# Patient Record
Sex: FEMALE | Race: WHITE | NOT HISPANIC OR LATINO | Employment: PART TIME | URBAN - METROPOLITAN AREA
[De-identification: names, ages, dates, MRNs, and addresses within clinical notes are randomized per-mention and may not be internally consistent; named-entity substitution may affect disease eponyms.]

---

## 2017-01-05 ENCOUNTER — TRANSCRIBE ORDERS (OUTPATIENT)
Dept: ADMINISTRATIVE | Facility: HOSPITAL | Age: 22
End: 2017-01-05

## 2017-01-05 ENCOUNTER — ALLSCRIPTS OFFICE VISIT (OUTPATIENT)
Dept: OTHER | Facility: OTHER | Age: 22
End: 2017-01-05

## 2017-01-05 DIAGNOSIS — R01.1 CARDIAC MURMUR: ICD-10-CM

## 2017-01-05 DIAGNOSIS — M54.9 DORSALGIA: ICD-10-CM

## 2017-01-05 DIAGNOSIS — R01.0 STILL'S MURMUR: Primary | ICD-10-CM

## 2017-01-05 DIAGNOSIS — N94.6 DYSMENORRHEA: ICD-10-CM

## 2017-01-05 DIAGNOSIS — Z13.6 ENCOUNTER FOR SCREENING FOR CARDIOVASCULAR DISORDERS: ICD-10-CM

## 2017-01-13 ENCOUNTER — APPOINTMENT (OUTPATIENT)
Dept: LAB | Age: 22
End: 2017-01-13
Payer: COMMERCIAL

## 2017-01-13 ENCOUNTER — TRANSCRIBE ORDERS (OUTPATIENT)
Dept: ADMINISTRATIVE | Age: 22
End: 2017-01-13

## 2017-01-13 DIAGNOSIS — N94.6 DYSMENORRHEA: ICD-10-CM

## 2017-01-13 DIAGNOSIS — R01.1 CARDIAC MURMUR: ICD-10-CM

## 2017-01-13 DIAGNOSIS — Z13.6 ENCOUNTER FOR SCREENING FOR CARDIOVASCULAR DISORDERS: ICD-10-CM

## 2017-01-13 LAB
ALBUMIN SERPL BCP-MCNC: 3.8 G/DL (ref 3.5–5)
ALP SERPL-CCNC: 70 U/L (ref 46–116)
ALT SERPL W P-5'-P-CCNC: 28 U/L (ref 12–78)
ANION GAP SERPL CALCULATED.3IONS-SCNC: 7 MMOL/L (ref 4–13)
AST SERPL W P-5'-P-CCNC: 17 U/L (ref 5–45)
BILIRUB SERPL-MCNC: 0.47 MG/DL (ref 0.2–1)
BUN SERPL-MCNC: 14 MG/DL (ref 5–25)
CALCIUM SERPL-MCNC: 9 MG/DL (ref 8.3–10.1)
CHLORIDE SERPL-SCNC: 109 MMOL/L (ref 100–108)
CHOLEST SERPL-MCNC: 144 MG/DL (ref 50–200)
CO2 SERPL-SCNC: 25 MMOL/L (ref 21–32)
CREAT SERPL-MCNC: 0.84 MG/DL (ref 0.6–1.3)
ERYTHROCYTE [DISTWIDTH] IN BLOOD BY AUTOMATED COUNT: 13.2 % (ref 11.6–15.1)
GFR SERPL CREATININE-BSD FRML MDRD: >60 ML/MIN/1.73SQ M
GLUCOSE SERPL-MCNC: 86 MG/DL (ref 65–140)
HCT VFR BLD AUTO: 37.9 % (ref 34.8–46.1)
HDLC SERPL-MCNC: 56 MG/DL (ref 40–60)
HGB BLD-MCNC: 12.6 G/DL (ref 11.5–15.4)
LDLC SERPL CALC-MCNC: 80 MG/DL (ref 0–100)
MCH RBC QN AUTO: 29.6 PG (ref 26.8–34.3)
MCHC RBC AUTO-ENTMCNC: 33.2 G/DL (ref 31.4–37.4)
MCV RBC AUTO: 89 FL (ref 82–98)
PLATELET # BLD AUTO: 240 THOUSANDS/UL (ref 149–390)
PMV BLD AUTO: 10.2 FL (ref 8.9–12.7)
POTASSIUM SERPL-SCNC: 4.3 MMOL/L (ref 3.5–5.3)
PROT SERPL-MCNC: 7.1 G/DL (ref 6.4–8.2)
RBC # BLD AUTO: 4.26 MILLION/UL (ref 3.81–5.12)
SODIUM SERPL-SCNC: 141 MMOL/L (ref 136–145)
TRIGL SERPL-MCNC: 40 MG/DL
TSH SERPL DL<=0.05 MIU/L-ACNC: 2.9 UIU/ML (ref 0.36–3.74)
WBC # BLD AUTO: 3.89 THOUSAND/UL (ref 4.31–10.16)

## 2017-01-13 PROCEDURE — 36415 COLL VENOUS BLD VENIPUNCTURE: CPT

## 2017-01-13 PROCEDURE — 80061 LIPID PANEL: CPT

## 2017-01-13 PROCEDURE — 80053 COMPREHEN METABOLIC PANEL: CPT

## 2017-01-13 PROCEDURE — 85027 COMPLETE CBC AUTOMATED: CPT

## 2017-01-13 PROCEDURE — 84443 ASSAY THYROID STIM HORMONE: CPT

## 2017-01-18 ENCOUNTER — HOSPITAL ENCOUNTER (OUTPATIENT)
Dept: NON INVASIVE DIAGNOSTICS | Facility: CLINIC | Age: 22
Discharge: HOME | End: 2017-01-18
Payer: COMMERCIAL

## 2017-01-18 DIAGNOSIS — R01.0 STILL'S MURMUR: ICD-10-CM

## 2017-01-18 PROCEDURE — 93306 TTE W/DOPPLER COMPLETE: CPT

## 2017-01-27 ENCOUNTER — GENERIC CONVERSION - ENCOUNTER (OUTPATIENT)
Dept: OTHER | Facility: OTHER | Age: 22
End: 2017-01-27

## 2017-02-07 ENCOUNTER — GENERIC CONVERSION - ENCOUNTER (OUTPATIENT)
Dept: OTHER | Facility: OTHER | Age: 22
End: 2017-02-07

## 2017-02-13 ENCOUNTER — GENERIC CONVERSION - ENCOUNTER (OUTPATIENT)
Dept: OTHER | Facility: OTHER | Age: 22
End: 2017-02-13

## 2018-01-13 VITALS
WEIGHT: 153.5 LBS | SYSTOLIC BLOOD PRESSURE: 122 MMHG | HEIGHT: 67 IN | DIASTOLIC BLOOD PRESSURE: 60 MMHG | BODY MASS INDEX: 24.09 KG/M2

## 2018-01-16 NOTE — PROGRESS NOTES
Chief Complaint  Pt presents for Depo injection  Pt states she has been getting depo at school  [facility]  Depo given in Left buttocks  Tolerated injection well  Pt aware that she needs to make yearly appt  Active Problems    1  Acne (706 1) (L70 9)   2  Altitude sickness (993 2) (T70 29XA)   3  Asthma (493 90) (J45 909)   4  Depo contraception (V2 49) (Z30 40)   5  History of Depo contraception (V2 49) (Z30 40)   6  Encounter for contraceptive surveillance (V2 40) (Z30 40)   7  Encounter for routine gynecological examination (V72 31) (Z01 419)   8  Foot laceration (892 0) (S91 319A)   9  History of allergy (V15 09) (Z88 9)   10  Initiation of Depo Provera (V2 02) (Z30 013)   11  Screen for sexually transmitted diseases (V74 5) (Z11 3)   12  Screening examination for STD (sexually transmitted disease) (V74 5) (Z11 3)   13  Screening for cardiovascular condition (V81 2) (Z13 6)    Current Meds   1  MedroxyPROGESTERone Acetate 150 MG/ML Intramuscular Suspension; inject every 12   weeks as directed; Therapy: 99XKX6080 to ([place])  Requested for: 773 830 025; Last   Rx:2014 Ordered   2  MedroxyPROGESTERone Acetate 150 MG/ML Intramuscular Suspension; INJECT   EVERY 12 WEEKS AS DIRECTED; Therapy: 69IJK8307 to (044 596 18 66)  Requested for: 02Qje0248; Last   Rx:[id number] Ordered    Allergies    1  Percocet TABS   2  Vicodin TABS    3  Dust   4  Mold   5  Pollen    Plan   MedroxyPROGESTERone Acetate 150 MG/ML Intramuscular Suspension; INJECT 1 ML INTRAMUSCULARLY ONCE EVERY 3 MONTHS; Done: 98DUI4126 04:37PM; Status: COMPLETE - Retrospective By Protocol Authorization Ordered  Rx By: Jamee Chávez;   For: SocHx: Depo contraception; Dose of 150 MG;  Intramuscular; IRMA = N; Administered by: Godfrey Swann: 2016 4:37:00 PM; Last Updated By: Godfrey Swann; 2016 5:39:04 PM  Urine HCG- POC; Status:Resulted - Requires Verification,Retrospective By Protocol Authorization;   Done: 73XYS2660 12:00AM  Due:58Iiw5879; Last Updated Krissy Saldivar; 7/14/2016 5:39:02 PM;Ordered; Today;     For:SocHx: Depo contraception; Ordered By:Harry Jean;      Signatures   Electronically signed by : Sandra Michelle, ; Jul 14 2016  4:41PM EST                       (Co-author)    Electronically signed by : Lucio Ryder DO; Jul 14 2016  5:39PM EST                       (Author)
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
## 2024-10-02 ENCOUNTER — TELEPHONE (OUTPATIENT)
Dept: FAMILY MEDICINE CLINIC | Facility: CLINIC | Age: 29
End: 2024-10-02

## 2024-10-02 NOTE — TELEPHONE ENCOUNTER
Patient has established care at [facility] at the [facility]. Please remove current PCP.

## 2024-10-03 NOTE — TELEPHONE ENCOUNTER
10/03/24 7:31 AM        The office's request has been received, reviewed, and the patient chart updated. The PCP has successfully been removed with a patient attribution note. This message will now be completed.        Thank you  Marsha Quintero